# Patient Record
Sex: FEMALE | Race: OTHER | Employment: STUDENT | URBAN - METROPOLITAN AREA
[De-identification: names, ages, dates, MRNs, and addresses within clinical notes are randomized per-mention and may not be internally consistent; named-entity substitution may affect disease eponyms.]

---
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
## 2020-11-07 ENCOUNTER — OFFICE VISIT (OUTPATIENT)
Dept: FAMILY MEDICINE CLINIC | Facility: CLINIC | Age: 20
End: 2020-11-07
Payer: COMMERCIAL

## 2020-11-07 VITALS
TEMPERATURE: 98 F | SYSTOLIC BLOOD PRESSURE: 98 MMHG | DIASTOLIC BLOOD PRESSURE: 66 MMHG | RESPIRATION RATE: 14 BRPM | WEIGHT: 110 LBS | HEIGHT: 60 IN | HEART RATE: 82 BPM | BODY MASS INDEX: 21.6 KG/M2 | OXYGEN SATURATION: 100 %

## 2020-11-07 DIAGNOSIS — J06.9 VIRAL UPPER RESPIRATORY INFECTION: Primary | ICD-10-CM

## 2020-11-07 PROCEDURE — 3078F DIAST BP <80 MM HG: CPT | Performed by: NURSE PRACTITIONER

## 2020-11-07 PROCEDURE — 3008F BODY MASS INDEX DOCD: CPT | Performed by: NURSE PRACTITIONER

## 2020-11-07 PROCEDURE — 99202 OFFICE O/P NEW SF 15 MIN: CPT | Performed by: NURSE PRACTITIONER

## 2020-11-07 PROCEDURE — 3074F SYST BP LT 130 MM HG: CPT | Performed by: NURSE PRACTITIONER

## 2020-11-07 NOTE — PROGRESS NOTES
CHIEF COMPLAINT:   Patient presents with:  Cold      HPI:   Loli Ramos is a 23year old female who presents for upper respiratory symptoms for  3 days. Patient reports sore throat, congestion, dry cough, headache, chills.  Symptoms have been mild sinc EARS: TM's clear, no bulging, no retraction, no fluid, bony landmarks present  NOSE: Nostrils patent, clear nasal discharge, nasal mucosa erythematous   THROAT: Oral mucosa pink, moist. Posterior pharynx is not erythematous. no exudates. Tonsils 2/4.     NE There is no specific antiviral treatment recommended for COVID-19. People with COVID-19 should receive supportive care to help relieve symptoms.       Anyone who has been in close contact with someone who has COVID-19 should quarantine for at least 14 days 8. As much as possible, stay in a specific room and away from other people in your home. Also, you should use a separate bathroom, if available. If you need to be around other people in or outside of the home, wear a facemask.    9. Avoid sharing personal i If you have a fever with cough or shortness of breath but have not been exposed to someone with COVID-19 and have not tested positive for COVID-19, you should also stay home and away from others for a total of 10 days after your symptoms started, and at [employer] You can also get more information at the following websites:   Centers for Disease Control & Prevention (CDC)  What to do if you are sick with coronavirus disease 2019, Strobe.com.pt. pdf ? Asymptomatic individuals who've had a high-risk exposure (High risk, close contact / exposure is defined as being less than 6 feet from a COVID positive individual for more than 15 minutes cumulative over a 24-hour period starting from 2 days before illn People: As much as possible you should stay in a specific room and away from other people in your home. Also, you should use a separate bathroom, if available. Animals: Do not handle pets or other animals while sick.    Call ahead before visiting your doct Wash your hands often with soap and water for at least 20 seconds.  If soap and water are not available, clean your hands with an alcohol-based hand  that contains at least 60% alcohol, covering all surfaces of your hands and rubbing them together Patients with confirmed COVID-19 should remain under home isolation precautions until the risk of secondary transmission to others is thought to be low.  The decision to discontinue home isolation precautions should be made on a case-by-case basis, in consu Centers for Disease Control & Prevention (CDC)  10 things you can do to manage your health at home, KaraokeExchange.nl. pdf          Viral Upper Respiratory Illness (Adult)    You have a viral upper respiratory illn · Over-the-counter cold medicines will not shorten the length of time you’re sick, but they may be helpful for the following symptoms: cough, sore throat, and nasal and sinus congestion.  If you take prescription medicines, ask your healthcare provider or p · Take acetaminophen or a nonsteroidal anti-inflammatory drug (NSAID) such as ibuprofen. Treating a stuffy nose  · Breathe steam or heated humidified air to open blocked nasal passages.  a hot shower or use a vaporizer.  Be careful not to get bur · Signs of dehydration:  ? Extreme thirst  ? Dark urine  ? Not urinating often  ?  Dry mouth     When to call 911  Call 911 right away if any of these occur:  · Chest pain  · Coughing up blood  · Fast or irregular heartbeat  · Severe trouble breathing  · [place]

## 2020-11-07 NOTE — PATIENT INSTRUCTIONS
Coronavirus Disease 2019 (COVID-19)     [place] [address] is committed to the safety and well-being of our patients, members, employees, and communities.  As concerns arise about the new strain of coronavirus that causes COVID-19, [place] [address] 4. If you have a medical appointment, call the healthcare provider ahead of time and tell them that you have or may have COVID-19.  5. For medical emergencies, call 911 and notify the dispatch personnel that you have or may have COVID-19.   6. Cover your c · At least 10 days have passed since symptoms first appeared OR if asymptomatic patient or date of symptom onset is unclear then use 10 days post COVID test date.    · At least 20 days have passed for severe illness (requiring hospitalization) OR if you are *Some people will be required to have a repeat COVID-19 test in order to be eligible to donate. If you’re instructed by Baldomero Kate that a repeat test is required, please contact the [facility] COVID-19 Nurse Triage Line at [phone].     Additional Inf ? Asymptomatic individuals who've had a high-risk exposure (High risk, close contact / exposure is defined as being less than 6 feet from a COVID positive individual for more than 15 minutes cumulative over a 24-hour period starting from 2 days before illn People: As much as possible you should stay in a specific room and away from other people in your home. Also, you should use a separate bathroom, if available. Animals: Do not handle pets or other animals while sick.    Call ahead before visiting your doct Wash your hands often with soap and water for at least 20 seconds.  If soap and water are not available, clean your hands with an alcohol-based hand  that contains at least 60% alcohol, covering all surfaces of your hands and rubbing them together Patients with confirmed COVID-19 should remain under home isolation precautions until the risk of secondary transmission to others is thought to be low.  The decision to discontinue home isolation precautions should be made on a case-by-case basis, in consu Centers for Disease Control & Prevention (CDC)  10 things you can do to manage your health at home, KaraokeExchange.nl. pdf          Viral Upper Respiratory Illness (Adult)    You have a viral upper respiratory illn · Over-the-counter cold medicines will not shorten the length of time you’re sick, but they may be helpful for the following symptoms: cough, sore throat, and nasal and sinus congestion.  If you take prescription medicines, ask your healthcare provider or p · Take acetaminophen or a nonsteroidal anti-inflammatory drug (NSAID) such as ibuprofen. Treating a stuffy nose  · Breathe steam or heated humidified air to open blocked nasal passages.  a hot shower or use a vaporizer.  Be careful not to get bur · Signs of dehydration:  ? Extreme thirst  ? Dark urine  ? Not urinating often  ?  Dry mouth     When to call 911  Call 911 right away if any of these occur:  · Chest pain  · Coughing up blood  · Fast or irregular heartbeat  · Severe trouble breathing  · [place]

## 2020-11-10 ENCOUNTER — TELEPHONE (OUTPATIENT)
Dept: URGENT CARE | Age: 20
End: 2020-11-10

## 2020-11-10 NOTE — ED NOTES
Pt called TL. Informed of POSITIVE Covid results. Discussed [facility] quarantine guidelines.

## 2021-01-14 ENCOUNTER — OFFICE VISIT (OUTPATIENT)
Dept: OBGYN CLINIC | Facility: CLINIC | Age: 21
End: 2021-01-14
Payer: COMMERCIAL

## 2021-01-14 VITALS — WEIGHT: 114 LBS | DIASTOLIC BLOOD PRESSURE: 66 MMHG | BODY MASS INDEX: 22 KG/M2 | SYSTOLIC BLOOD PRESSURE: 110 MMHG

## 2021-01-14 DIAGNOSIS — Z11.3 SCREENING FOR STDS (SEXUALLY TRANSMITTED DISEASES): ICD-10-CM

## 2021-01-14 DIAGNOSIS — Z01.419 ENCOUNTER FOR WELL WOMAN EXAM WITH ROUTINE GYNECOLOGICAL EXAM: Primary | ICD-10-CM

## 2021-01-14 PROBLEM — U07.1 COVID-19: Status: ACTIVE | Noted: 2021-01-14

## 2021-01-14 PROCEDURE — 3078F DIAST BP <80 MM HG: CPT | Performed by: OBSTETRICS & GYNECOLOGY

## 2021-01-14 PROCEDURE — 3074F SYST BP LT 130 MM HG: CPT | Performed by: OBSTETRICS & GYNECOLOGY

## 2021-01-14 PROCEDURE — 99385 PREV VISIT NEW AGE 18-39: CPT | Performed by: OBSTETRICS & GYNECOLOGY

## 2021-01-15 NOTE — PROGRESS NOTES
HPI:   Patricia Berrios is a 23year old female who presents for an annual/pap. Pt with hx of covid 11/9/20.       Wt Readings from Last 6 Encounters:  01/14/21 : 114 lb (51.7 kg)  11/07/20 : 110 lb (49.9 kg)  12/01/17 : 114 lb 12.8 oz (52.1 kg) (24 %, Z= - nourished,in no apparent distress  SKIN: no rashes,no suspicious lesions  HEENT: atraumatic, normocephalic  EYES:normal in appearance  NECK: supple,no adenopathy  CHEST: no chest tenderness  BREAST: no dominant or suspicious mass  LUNGS: clear to auscultat

## 2021-01-16 LAB
C TRACH DNA SPEC QL NAA+PROBE: NEGATIVE
HPV I/H RISK 1 DNA SPEC QL NAA+PROBE: NEGATIVE
N GONORRHOEA DNA SPEC QL NAA+PROBE: NEGATIVE

## 2021-06-28 ENCOUNTER — TELEPHONE (OUTPATIENT)
Dept: SCHEDULING | Age: 21
End: 2021-06-28

## 2021-06-29 ENCOUNTER — APPOINTMENT (OUTPATIENT)
Dept: URGENT CARE | Age: 21
End: 2021-06-29

## 2021-06-29 ENCOUNTER — TELEPHONE (OUTPATIENT)
Dept: SCHEDULING | Age: 21
End: 2021-06-29

## 2021-08-04 ENCOUNTER — IMMUNIZATION (OUTPATIENT)
Dept: LAB | Facility: HOSPITAL | Age: 21
End: 2021-08-04
Attending: EMERGENCY MEDICINE
Payer: COMMERCIAL

## 2021-08-04 DIAGNOSIS — Z23 NEED FOR VACCINATION: Primary | ICD-10-CM

## 2021-08-04 PROCEDURE — 0001A SARSCOV2 VAC 30MCG/0.3ML IM: CPT

## 2021-08-25 ENCOUNTER — IMMUNIZATION (OUTPATIENT)
Dept: LAB | Facility: HOSPITAL | Age: 21
End: 2021-08-25
Attending: EMERGENCY MEDICINE
Payer: COMMERCIAL

## 2021-08-25 DIAGNOSIS — Z23 NEED FOR VACCINATION: Primary | ICD-10-CM

## 2021-08-25 PROCEDURE — 0002A SARSCOV2 VAC 30MCG/0.3ML IM: CPT

## 2022-03-08 ENCOUNTER — OFFICE VISIT (OUTPATIENT)
Dept: OBGYN CLINIC | Facility: CLINIC | Age: 22
End: 2022-03-08
Payer: COMMERCIAL

## 2022-03-08 VITALS — BODY MASS INDEX: 23 KG/M2 | SYSTOLIC BLOOD PRESSURE: 110 MMHG | DIASTOLIC BLOOD PRESSURE: 64 MMHG | WEIGHT: 116.38 LBS

## 2022-03-08 DIAGNOSIS — Z01.419 WELL WOMAN EXAM WITH ROUTINE GYNECOLOGICAL EXAM: Primary | ICD-10-CM

## 2022-03-08 PROCEDURE — 99395 PREV VISIT EST AGE 18-39: CPT | Performed by: OBSTETRICS & GYNECOLOGY

## 2022-03-08 PROCEDURE — 3078F DIAST BP <80 MM HG: CPT | Performed by: OBSTETRICS & GYNECOLOGY

## 2022-03-08 PROCEDURE — 3074F SYST BP LT 130 MM HG: CPT | Performed by: OBSTETRICS & GYNECOLOGY

## 2022-03-24 ENCOUNTER — TELEPHONE (OUTPATIENT)
Dept: OBGYN CLINIC | Facility: CLINIC | Age: 22
End: 2022-03-24

## 2022-03-24 NOTE — TELEPHONE ENCOUNTER
[employer] message sent to pt.    ----- Message from Hardeep Smith MD sent at 3/24/2022  5:00 PM CDT -----  Normal pap

## 2022-10-18 ENCOUNTER — OFFICE VISIT (OUTPATIENT)
Dept: OBGYN CLINIC | Facility: CLINIC | Age: 22
End: 2022-10-18
Payer: COMMERCIAL

## 2022-10-18 VITALS — WEIGHT: 111 LBS | SYSTOLIC BLOOD PRESSURE: 108 MMHG | BODY MASS INDEX: 22 KG/M2 | DIASTOLIC BLOOD PRESSURE: 76 MMHG

## 2022-10-18 DIAGNOSIS — N89.8 VAGINAL DISCHARGE: Primary | ICD-10-CM

## 2022-10-18 DIAGNOSIS — R35.0 URINARY FREQUENCY: ICD-10-CM

## 2022-10-18 DIAGNOSIS — N86 EROSION AND ECTROPION OF CERVIX: ICD-10-CM

## 2022-10-18 LAB
BILIRUB UR QL: NEGATIVE
CLARITY UR: CLEAR
COLOR UR: YELLOW
GLUCOSE UR-MCNC: NEGATIVE MG/DL
HGB UR QL STRIP.AUTO: NEGATIVE
KETONES UR-MCNC: NEGATIVE MG/DL
NITRITE UR QL STRIP.AUTO: NEGATIVE
PH UR: 6 [PH] (ref 5–8)
PROT UR-MCNC: NEGATIVE MG/DL
SP GR UR STRIP: 1.01 (ref 1–1.03)
UROBILINOGEN UR STRIP-ACNC: 0.2

## 2022-10-18 PROCEDURE — 99213 OFFICE O/P EST LOW 20 MIN: CPT | Performed by: NURSE PRACTITIONER

## 2022-10-18 PROCEDURE — 3078F DIAST BP <80 MM HG: CPT | Performed by: NURSE PRACTITIONER

## 2022-10-18 PROCEDURE — 3074F SYST BP LT 130 MM HG: CPT | Performed by: NURSE PRACTITIONER

## 2022-10-19 LAB
C TRACH DNA SPEC QL NAA+PROBE: NEGATIVE
N GONORRHOEA DNA SPEC QL NAA+PROBE: NEGATIVE

## 2022-10-20 ENCOUNTER — PATIENT MESSAGE (OUTPATIENT)
Dept: OBGYN CLINIC | Facility: CLINIC | Age: 22
End: 2022-10-20

## 2022-10-20 LAB
GENITAL VAGINOSIS SCREEN: NEGATIVE
TRICHOMONAS SCREEN: NEGATIVE

## 2022-10-21 NOTE — TELEPHONE ENCOUNTER
----- Message from Gloria Cano RN sent at 10/21/2022  8:21 AM CDT -----  Regarding: FW: Test Results    ----- Message -----  From: Latricia Lobo  Sent: 10/20/2022  10:09 PM CDT  To: [facility] Clinical Staff  Subject: Test Results                                     Hello,     I have reviewed my test results and it looks like for most of them I am negative or within normal limits. Is there anything that you suggest for the issues I am having. I am concerned at this point that it is stress incontinence. that is causing the discharge to look yellow? I will do pelvic floor muscle exercises but not sure what else to do. Please advise.      Thank you,  [id number]

## 2022-10-21 NOTE — TELEPHONE ENCOUNTER
Telephone encounter placed to review continued symptoms of vaginal discharge with negative vaginal cultures. Discussed cervical ectropion with increased mucous production and possible treatment with boric acid vaginal suppositories. Discussed that cervical ectropion is a normal finding/variant and treatment isn't needed of discharge is not bothersome. Emma Mueller is unsure if she is seeing yellow vaginal discharge or urine and will continue to monitor this occurrence and hold boric acid suppositories at this time. Encouraged Kegel exercise and discussed possible pelvic floor physical therapy if she feels that she is having stress incontinence. Encourage to reach out with further questions or concerns.

## 2023-05-26 ENCOUNTER — NURSE ONLY (OUTPATIENT)
Dept: INTERNAL MEDICINE CLINIC | Facility: HOSPITAL | Age: 23
End: 2023-05-26
Attending: EMERGENCY MEDICINE

## 2023-05-26 DIAGNOSIS — Z00.00 WELLNESS EXAMINATION: Primary | ICD-10-CM

## 2023-05-26 PROCEDURE — 86480 TB TEST CELL IMMUN MEASURE: CPT

## 2023-05-28 LAB
M TB IFN-G CD4+ T-CELLS BLD-ACNC: 0.05 IU/ML
M TB TUBERC IFN-G BLD QL: NEGATIVE
M TB TUBERC IGNF/MITOGEN IGNF CONTROL: >10 IU/ML
QFT TB1 AG MINUS NIL: 0.04 IU/ML
QFT TB2 AG MINUS NIL: 0 IU/ML

## 2024-04-30 ENCOUNTER — OFFICE VISIT (OUTPATIENT)
Dept: OBGYN CLINIC | Facility: CLINIC | Age: 24
End: 2024-04-30

## 2024-04-30 VITALS
HEIGHT: 60 IN | DIASTOLIC BLOOD PRESSURE: 60 MMHG | BODY MASS INDEX: 20.37 KG/M2 | WEIGHT: 103.75 LBS | SYSTOLIC BLOOD PRESSURE: 98 MMHG

## 2024-04-30 DIAGNOSIS — Z01.419 ENCOUNTER FOR WELL WOMAN EXAM WITH ROUTINE GYNECOLOGICAL EXAM: Primary | ICD-10-CM

## 2024-04-30 DIAGNOSIS — Z11.3 SCREEN FOR STD (SEXUALLY TRANSMITTED DISEASE): ICD-10-CM

## 2024-04-30 DIAGNOSIS — N76.0 VAGINITIS AND VULVOVAGINITIS: ICD-10-CM

## 2024-04-30 DIAGNOSIS — Z12.4 ENCOUNTER FOR PAPANICOLAOU SMEAR FOR CERVICAL CANCER SCREENING: ICD-10-CM

## 2024-04-30 PROCEDURE — 96127 BRIEF EMOTIONAL/BEHAV ASSMT: CPT | Performed by: OBSTETRICS & GYNECOLOGY

## 2024-04-30 PROCEDURE — 99395 PREV VISIT EST AGE 18-39: CPT | Performed by: OBSTETRICS & GYNECOLOGY

## 2024-04-30 RX ORDER — NORETHINDRONE ACETATE AND ETHINYL ESTRADIOL, ETHINYL ESTRADIOL AND FERROUS FUMARATE 1MG-10(24)
1 KIT ORAL DAILY
Qty: 84 TABLET | Refills: 4 | Status: SHIPPED | OUTPATIENT
Start: 2024-04-30 | End: 2025-04-30

## 2024-04-30 RX ORDER — NORETHINDRONE ACETATE AND ETHINYL ESTRADIOL, ETHINYL ESTRADIOL AND FERROUS FUMARATE 1MG-10(24)
1 KIT ORAL DAILY
Qty: 84 TABLET | Refills: 0 | COMMUNITY
Start: 2024-04-30 | End: 2025-04-30

## 2024-04-30 NOTE — PROGRESS NOTES
[facility]  Obstetrics and Gynecology  Gynecology Visit    Chief Complaint   Patient presents with    Annual           Nancy Prado is a 24 year old female who presents for Annual.    LMP: 04/19/2024.    Menses regular: 28 days.    Menstrual flow normal: Moderate.    Birth control or HRT:  No.   Refill no  Last Pap Smear: 3/8/22.  Any history of abnormal paps: No   Last MMG: n/a  Any Medication Refills needed today?: no  Sleep: 5-6 hrs.    Diet: Balanced.    Exercise: Yes.   Screening labs/Blood work today: No.     Colonoscopy (if over 44 y/o): n/a.   Gardasil:(age 9-44 y/o) No.   Genetic Cancer screen (if indicated): No.   Flu (Aug-April): No.TDAP (every 10 years) under 5 years ago.      Additional Problems/concerns: Patient reports no concerns at this time. Reports a lot of discharge all the time.      Next Appt: Scheduled.     Immunization History   Administered Date(s) Administered    Covid-19 Vaccine Pfizer 30 mcg/0.3 ml 08/04/2021, 08/25/2021         Current Outpatient Medications:     Norethin-Eth Estrad-Fe Biphas (LO LOESTRIN FE) 1 MG-10 MCG / 10 MCG Oral Tab, Take 1 tablet by mouth daily., Disp: 84 tablet, Rfl: 0    Norethin-Eth Estrad-Fe Biphas (LO LOESTRIN FE) 1 MG-10 MCG / 10 MCG Oral Tab, Take 1 tablet by mouth daily., Disp: 84 tablet, Rfl: 4    Allergies   Allergen Reactions    Seasonal OTHER (SEE COMMENTS)       OB History   No obstetric history on file.       Past Medical History:    Anemia       Past Surgical History:   Procedure Laterality Date    Hip surgery Right     Other surgical history  age 9    cyst on hip       History reviewed. No pertinent family history.     Tobacco  Allergies  Meds  Med Hx  Surg Hx  Fam Hx  Soc Hx        Social History     Socioeconomic History    Marital status: Single     Spouse name: Not on file    Number of children: Not on file    Years of education: Not on file    Highest education level: Not on file   Occupational History    Not on file   Tobacco  Use    Smoking status: Never     Passive exposure: Never    Smokeless tobacco: Never   Substance and Sexual Activity    Alcohol use: Never    Drug use: Never    Sexual activity: Not on file   Other Topics Concern    Not on file   Social History Narrative    Not on file     Social Determinants of Health     Financial Resource Strain: Not on file   Food Insecurity: Not on file   Transportation Needs: Not on file   Physical Activity: Not on file   Stress: Not on file   Social Connections: Not on file   Housing Stability: Not on file     BP 98/60   Ht 5' (1.524 m)   Wt 103 lb 11.6 oz (47 kg)   LMP 04/19/2024   BMI 20.26 kg/m²     Wt Readings from Last 3 Encounters:   04/30/24 103 lb 11.6 oz (47 kg)   10/18/22 111 lb (50.3 kg)   03/08/22 116 lb 6.4 oz (52.8 kg)         Health Maintenance   Topic Date Due    Influenza Vaccine (1) 08/01/2021    Screen for Cervical Cancer 11/05/2021    DTaP,Tdap and Td Vaccines (3 - Td or Tdap) 03/18/2025    Hepatitis C Screening Completed    HIV Screening Completed    COVID-19 Vaccine Completed     Review of Systems   General: Present- Feeling well. Not Present- Chills, Fever, Weight Gain and Weight Loss.  HEENT: Not Present- Headache and Sore Throat.  Respiratory: Not Present- Cough, Difficulty Breathing, Hemoptysis and Sputum Production.  Cardiovascular: Not Present- Chest Pain, Elevated Blood Pressure, Fainting / Blacking Out and Shortness of Breath.  Gastrointestinal: Not Present- Constipation, Diarrhea, Nausea and Vomiting.  Female Genitourinary: Not Present- Discharge, Dysuria and Frequency.  Musculoskeletal: Not Present- Leg Cramps and Swelling of Extremities.  Neurological: Not Present- Dizziness and Headaches.  Psychiatric: Not Present- Anxiety and Depression.  Endocrine: Not Present- Appetite Changes, Hair Changes and Thyroid Problems.  Hematology: Not Present- Easy Bruising and Excessive bleeding.  All other systems negative     Physical Exam The physical exam findings are  as follows:     General   Mental Status - Alert. General Appearance - Cooperative. Orientation - Oriented X4. Build & Nutrition - Well nourished.    Head and Neck  Thyroid   Gland Characteristics - normal size and consistency.    Chest and Lung Exam   Inspection:   Chest Wall: - Normal.  Percussion:   Quality and Intensity: - Percussion normal.  Palpation: - Palpation normal.  Auscultation:   Breath sounds: - Normal.  Adventitious sounds: - No Adventitious sounds.    Breast   Nipples: Characteristics - Bilateral - Normal. Discharge - Bilateral - None.  Breast - Bilateral - Symmetric.    Cardiovascular   Auscultation: Rhythm - Regular. Heart Sounds - Normal heart sounds.  Murmurs & Other Heart Sounds: Auscultation of the heart reveals - No Murmurs.    Abdomen   Inspection: Inspection of the abdomen reveals - No Hernias. Incisional scars - no incisional scars.  Palpation/Percussion: Palpation and Percussion of the abdomen reveal - Non Tender and No Palpable abdominal masses.  Liver: - Normal.  Auscultation: Auscultation of the abdomen reveals - Bowel sounds normal.    Female Genitourinary     External Genitalia   Perineum - Normal. Bartholin's Gland - Bilateral - Normal. Clitoris - Normal.  Introitus: Characteristics - No Cystocele, Enterocele or Rectocele. Discharge - None.  Labia Majora: Lesions - Bilateral - None. Characteristics - Bilateral - Normal.  Labia Minora: Lesions - Bilateral - None. Characteristics - Bilateral - Normal.  Urethra: Characteristics - Normal. Discharge - None.  [place] Gland - Bilateral - Normal.  Vulva: Characteristics - Normal. Lesions - None.    Speculum & Bimanual   Vagina:   Vaginal Wall: - Normal.  Vaginal Lesions - None. Vaginal Mucosa - Normal.  Cervix: Characteristics - No Motion tenderness. Discharge - None.  Uterus: Characteristics - Normal. Position - Midposition.  Adnexa: Characteristics - Bilateral - Normal. Masses - No Adnexal Masses.  Wet [place]: pH - 3.8-4.2. Vaginal discharge  - Clear  and Thin. Amine Odor - Absent. Main patient complaints - Discharge. Microscopy - Lactobacilli and Epithelial cells.    Rectal   Anorectal Exam: External - normal external exam.    Peripheral Vascular   Upper Extremity:   Palpation: - Pulses bilaterally normal.  Lower Extremity: Inspection - Bilateral - Inspection Normal.  Palpation: Edema - Bilateral - No edema.    Neurologic   Mental Status: - Normal.    Lymphatic  General Lymphatics   Description - Normal .       Plan to try OCPs for 3 months to see if we can decrease her amount of vaginal discharge, infectious workup done today, Pap also done.  Probiotic information and vulvar care handout also given.    1. Encounter for well woman exam with routine gynecological exam  - ThinPrep PAP with HPV Reflex Request B; Future    2. Encounter for Papanicolaou smear for cervical cancer screening  - ThinPrep PAP with HPV Reflex Request B; Future    3. Screen for STD (sexually transmitted disease)  - Chlamydia/Gc Amplification; Future    4. Vaginitis and vulvovaginitis  - Vaginitis Vaginosis PCR Panel; Future

## 2024-05-01 LAB
C TRACH DNA SPEC QL NAA+PROBE: NEGATIVE
N GONORRHOEA DNA SPEC QL NAA+PROBE: NEGATIVE

## 2024-05-05 LAB
.: NORMAL
.: NORMAL

## 2025-04-28 ENCOUNTER — OFFICE VISIT (OUTPATIENT)
Dept: FAMILY MEDICINE CLINIC | Facility: CLINIC | Age: 25
End: 2025-04-28

## 2025-04-28 VITALS
HEART RATE: 69 BPM | BODY MASS INDEX: 20.2 KG/M2 | DIASTOLIC BLOOD PRESSURE: 71 MMHG | WEIGHT: 107 LBS | SYSTOLIC BLOOD PRESSURE: 114 MMHG | HEIGHT: 61 IN

## 2025-04-28 DIAGNOSIS — K85.90 ACUTE PANCREATITIS, UNSPECIFIED COMPLICATION STATUS, UNSPECIFIED PANCREATITIS TYPE (HCC): ICD-10-CM

## 2025-04-28 DIAGNOSIS — R73.03 PREDIABETES: Primary | ICD-10-CM

## 2025-04-28 PROBLEM — U07.1 COVID-19: Status: RESOLVED | Noted: 2021-01-14 | Resolved: 2025-04-28

## 2025-04-28 PROBLEM — R10.13 ABDOMINAL PAIN, EPIGASTRIC: Status: RESOLVED | Noted: 2017-12-01 | Resolved: 2025-04-28

## 2025-04-28 LAB — HEMOGLOBIN A1C: 5.3 % (ref 4.3–5.6)

## 2025-04-28 PROCEDURE — 99204 OFFICE O/P NEW MOD 45 MIN: CPT | Performed by: FAMILY MEDICINE

## 2025-04-28 PROCEDURE — 83036 HEMOGLOBIN GLYCOSYLATED A1C: CPT | Performed by: FAMILY MEDICINE

## 2025-04-28 NOTE — PROGRESS NOTES
HPI:   Nancy Prado is a 25 year old female who presents for an establish care visit.  She has a history of acute pancreatitis and recently found to have prediabetes with an A1c of 5.7% at a health fair.  Has made various diet and lifestyle changes including increasing exercise to 5 times a week with moderate intensity cardiovascular exercise.  She has reduced her sweets intake significantly.    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   Living with parents.     Menses regular      Diet - has made changes  Doing cardio.   Zero etoh use.     Wt Readings from Last 3 Encounters:   25 107 lb (48.5 kg)   24 103 lb 11.6 oz (47 kg)   10/18/22 111 lb (50.3 kg)     Body mass index is 20.22 kg/m².       Current Medications[1]   Past Medical History[2]   Past Surgical History[3]   Family History[4]   Social History:   Short Social Hx on File[5]       REVIEW OF SYSTEMS:   Negative, except per HPI.     EXAM:   /71 (BP Location: Right arm, Patient Position: Sitting, Cuff Size: adult)   Pulse 69   Ht 5' 1\" (1.549 m)   Wt 107 lb (48.5 kg)   LMP 2025 (Approximate)   BMI 20.22 kg/m²     GENERAL: well developed, well nourished, in no apparent distress  SKIN: no rashes, no suspicious lesions  HEENT: atraumatic, normocephalic  EYES: PERRLA, EOMI,conjunctiva are clear  NECK: supple, no adenopathy    ASSESSMENT AND PLAN:     1. Prediabetes  - Patient has made various diet and lifestyle changes.  Her previous A1c in 2024 was at 5.7%  - POC Glycohemoglobin [49012]    2. Acute pancreatitis, unspecified complication status, unspecified pancreatitis type (HCC)  -Noted in history.  Patient denies any alcohol use.  Was likely related to medications that were being taken at the time.     RTC if no improvement in symptoms. Red flags discussed to go to ER.     Teresa Ramirez MD  2025  1:15 PM           [1]   No current outpatient medications on file.   [2]   Past Medical History:   Anemia   [3]   Past  Surgical History:  Procedure Laterality Date    Hip surgery Right     Other surgical history  age 9    cyst on hip   [4] History reviewed. No pertinent family history.  [5]   Social History  Socioeconomic History    Marital status: Single   Tobacco Use    Smoking status: Never     Passive exposure: Never    Smokeless tobacco: Never   Substance and Sexual Activity    Alcohol use: Never    Drug use: Never

## (undated) NOTE — LETTER
Hospital Discharge Documentation  Please phone to schedule a hospital follow up appointment.     From: [address] [facility] Office  Phone: [phone]    Patient discharged time/date: 12/3/2017 11:02 AM  Patient discharge disposition:  Home or Self Acute pancreatitis, unspecified complication status, unspecified pancreatitis type[RS. 2]      Reason for Admission:[RS.1] Abd pain[RS.3]    Physical Exam:[RS.1]   12/03/17  0934   BP: 97/58   Pulse: 77   Resp: 18   Temp: 98 °F (36.7 °C)[RS.4]     Genera Deemed stable for discharge     Strep pharyngitis  Tested positive at PCPs office  Cont augmentin for 5 more days      [RS.3]  Consultations:[RS.1] None[RS. 3]    Procedures:[RS.1] None[RS. 3]    Complications:[RS.1] None[RS. 3]    Discharge Condition:[RS.1] DOC.4 - Lambert Monae MD on 12/3/2017 10:23 AM   RICHIE 5 - Lambert Monae MD on 12/3/2017 10:26 AM

## (undated) NOTE — MR AVS SNAPSHOT
After Visit Summary   1/14/2021    Loli Ramos    MRN: [id number]           Visit Information     Date & Time  1/14/2021  6:10 PM Provider  Jannette Stephen MD [address] [place] [address]  Dept.  Phone  [phone] non-emergency, consider your options before heading to an ER. VIDEO VISITS  Visit face-to-face with a [facility] physician or   YESSICA using your mobile device or computer   using [employer].    e-VISITS  Communicate with a [facility] Physician or YESSICA online.  The physician marcelo

## (undated) NOTE — LETTER
1/20/2021              [facility]        [id number] [address] [address]         Dear Natali Layne,    It was a pleasure to see you. Your PAP test was normal.  There is no need for further testing at this time.   I look forward to seein

## (undated) NOTE — ED AVS SNAPSHOT
Darren Mayda   MRN: [id number]    Department:  [facility] Emergency Department   Date of Visit:  12/1/2017           Disclosure     Insurance plans vary and the physician(s) referred by the ER may not be covered by your plan.  Please contact y CARE PHYSICIAN AT ONCE OR RETURN IMMEDIATELY TO THE EMERGENCY DEPARTMENT. If you have been prescribed any medication(s), please fill your prescription right away and begin taking the medication(s) as directed.   If you believe that any of the medications